# Patient Record
Sex: MALE | Race: OTHER | Employment: FULL TIME | URBAN - METROPOLITAN AREA
[De-identification: names, ages, dates, MRNs, and addresses within clinical notes are randomized per-mention and may not be internally consistent; named-entity substitution may affect disease eponyms.]

---

## 2020-09-28 PROBLEM — N52.9 VASCULOGENIC ERECTILE DYSFUNCTION: Status: ACTIVE | Noted: 2020-09-28

## 2020-09-28 PROBLEM — G47.33 OSA ON CPAP: Status: ACTIVE | Noted: 2020-09-28

## 2020-09-28 PROBLEM — E78.00 HYPERCHOLESTEROLEMIA: Status: ACTIVE | Noted: 2020-09-28

## 2020-09-28 PROBLEM — I10 ESSENTIAL HYPERTENSION: Status: ACTIVE | Noted: 2020-09-28

## 2020-09-28 PROBLEM — Z99.89 OSA ON CPAP: Status: ACTIVE | Noted: 2020-09-28

## 2020-10-13 PROBLEM — E03.8 SUBCLINICAL HYPOTHYROIDISM: Status: ACTIVE | Noted: 2020-10-13

## 2022-01-14 PROBLEM — N40.0 BENIGN PROSTATIC HYPERPLASIA WITHOUT LOWER URINARY TRACT SYMPTOMS: Status: ACTIVE | Noted: 2022-01-14

## 2022-03-14 PROBLEM — Z98.890 HISTORY OF COLONOSCOPY: Status: ACTIVE | Noted: 2022-03-14

## 2022-03-18 PROBLEM — I10 ESSENTIAL HYPERTENSION: Status: ACTIVE | Noted: 2020-09-28

## 2022-03-19 PROBLEM — N40.0 BENIGN PROSTATIC HYPERPLASIA WITHOUT LOWER URINARY TRACT SYMPTOMS: Status: ACTIVE | Noted: 2022-01-14

## 2022-03-19 PROBLEM — Z99.89 OSA ON CPAP: Status: ACTIVE | Noted: 2020-09-28

## 2022-03-19 PROBLEM — E78.00 HYPERCHOLESTEROLEMIA: Status: ACTIVE | Noted: 2020-09-28

## 2022-03-19 PROBLEM — E03.8 SUBCLINICAL HYPOTHYROIDISM: Status: ACTIVE | Noted: 2020-10-13

## 2022-03-19 PROBLEM — Z98.890 HISTORY OF COLONOSCOPY: Status: ACTIVE | Noted: 2022-03-14

## 2022-03-19 PROBLEM — N52.9 VASCULOGENIC ERECTILE DYSFUNCTION: Status: ACTIVE | Noted: 2020-09-28

## 2022-03-19 PROBLEM — G47.33 OSA ON CPAP: Status: ACTIVE | Noted: 2020-09-28

## 2022-05-09 ENCOUNTER — HOSPITAL ENCOUNTER (OUTPATIENT)
Dept: LAB | Age: 57
Discharge: HOME | End: 2022-05-09

## 2022-05-09 PROCEDURE — 88305 TISSUE EXAM BY PATHOLOGIST: CPT

## 2022-05-09 PROCEDURE — 88312 SPECIAL STAINS GROUP 1: CPT

## 2022-05-11 PROBLEM — K21.9 GASTROESOPHAGEAL REFLUX DISEASE WITHOUT ESOPHAGITIS: Status: ACTIVE | Noted: 2022-05-11

## 2022-06-01 ENCOUNTER — HOSPITAL ENCOUNTER (EMERGENCY)
Age: 57
Discharge: HOME | End: 2022-06-01
Attending: STUDENT IN AN ORGANIZED HEALTH CARE EDUCATION/TRAINING PROGRAM
Payer: COMMERCIAL

## 2022-06-01 VITALS
TEMPERATURE: 97.8 F | RESPIRATION RATE: 18 BRPM | OXYGEN SATURATION: 100 % | HEART RATE: 61 BPM | HEIGHT: 62 IN | DIASTOLIC BLOOD PRESSURE: 83 MMHG | BODY MASS INDEX: 32.76 KG/M2 | WEIGHT: 178 LBS | SYSTOLIC BLOOD PRESSURE: 123 MMHG

## 2022-06-01 DIAGNOSIS — R07.9 CHEST PAIN, UNSPECIFIED TYPE: Primary | ICD-10-CM

## 2022-06-01 LAB
ALBUMIN SERPL-MCNC: 4 G/DL (ref 3.5–5)
ALBUMIN/GLOB SERPL: 1.1 {RATIO} (ref 1.2–3.5)
ALP SERPL-CCNC: 78 U/L (ref 50–136)
ALT SERPL-CCNC: 31 U/L (ref 12–65)
ANION GAP SERPL CALC-SCNC: 10 MMOL/L (ref 7–16)
AST SERPL-CCNC: 20 U/L (ref 15–37)
BASOPHILS # BLD: 0.1 K/UL (ref 0–0.2)
BASOPHILS NFR BLD: 1 % (ref 0–2)
BILIRUB SERPL-MCNC: 0.5 MG/DL (ref 0.2–1.1)
BUN SERPL-MCNC: 11 MG/DL (ref 6–23)
CALCIUM SERPL-MCNC: 9.7 MG/DL (ref 8.3–10.4)
CHLORIDE SERPL-SCNC: 105 MMOL/L (ref 98–107)
CO2 SERPL-SCNC: 28 MMOL/L (ref 21–32)
CREAT SERPL-MCNC: 0.92 MG/DL (ref 0.8–1.5)
DIFFERENTIAL METHOD BLD: ABNORMAL
EOSINOPHIL # BLD: 0.2 K/UL (ref 0–0.8)
EOSINOPHIL NFR BLD: 3 % (ref 0.5–7.8)
ERYTHROCYTE [DISTWIDTH] IN BLOOD BY AUTOMATED COUNT: 14.8 % (ref 11.9–14.6)
GLOBULIN SER CALC-MCNC: 3.8 G/DL (ref 2.3–3.5)
GLUCOSE SERPL-MCNC: 115 MG/DL (ref 65–100)
HCT VFR BLD AUTO: 43.7 % (ref 41.1–50.3)
HGB BLD-MCNC: 14.6 G/DL (ref 13.6–17.2)
IMM GRANULOCYTES # BLD AUTO: 0 K/UL (ref 0–0.5)
IMM GRANULOCYTES NFR BLD AUTO: 0 % (ref 0–5)
LIPASE SERPL-CCNC: 199 U/L (ref 73–393)
LYMPHOCYTES # BLD: 2.9 K/UL (ref 0.5–4.6)
LYMPHOCYTES NFR BLD: 58 % (ref 13–44)
MCH RBC QN AUTO: 28 PG (ref 26.1–32.9)
MCHC RBC AUTO-ENTMCNC: 33.4 G/DL (ref 31.4–35)
MCV RBC AUTO: 83.9 FL (ref 79.6–97.8)
MONOCYTES # BLD: 0.5 K/UL (ref 0.1–1.3)
MONOCYTES NFR BLD: 10 % (ref 4–12)
NEUTS SEG # BLD: 1.4 K/UL (ref 1.7–8.2)
NEUTS SEG NFR BLD: 28 % (ref 43–78)
NRBC # BLD: 0 K/UL (ref 0–0.2)
PLATELET # BLD AUTO: 258 K/UL (ref 150–450)
PMV BLD AUTO: 11 FL (ref 9.4–12.3)
POTASSIUM SERPL-SCNC: 3.5 MMOL/L (ref 3.5–5.1)
PROT SERPL-MCNC: 7.8 G/DL (ref 6.3–8.2)
RBC # BLD AUTO: 5.21 M/UL (ref 4.23–5.6)
SODIUM SERPL-SCNC: 143 MMOL/L (ref 136–145)
TROPONIN I SERPL HS-MCNC: 25.5 PG/ML (ref 0–14)
TROPONIN I SERPL HS-MCNC: 27.4 PG/ML (ref 0–14)
WBC # BLD AUTO: 4.9 K/UL (ref 4.3–11.1)

## 2022-06-01 PROCEDURE — 80053 COMPREHEN METABOLIC PANEL: CPT

## 2022-06-01 PROCEDURE — 85025 COMPLETE CBC W/AUTO DIFF WBC: CPT

## 2022-06-01 PROCEDURE — 99284 EMERGENCY DEPT VISIT MOD MDM: CPT

## 2022-06-01 PROCEDURE — 83690 ASSAY OF LIPASE: CPT

## 2022-06-01 PROCEDURE — 84484 ASSAY OF TROPONIN QUANT: CPT

## 2022-06-01 PROCEDURE — 6370000000 HC RX 637 (ALT 250 FOR IP): Performed by: STUDENT IN AN ORGANIZED HEALTH CARE EDUCATION/TRAINING PROGRAM

## 2022-06-01 RX ORDER — ASPIRIN 325 MG
325 TABLET ORAL
Status: COMPLETED | OUTPATIENT
Start: 2022-06-01 | End: 2022-06-01

## 2022-06-01 RX ORDER — NITROGLYCERIN 0.4 MG/1
0.4 TABLET SUBLINGUAL EVERY 5 MIN PRN
Status: DISCONTINUED | OUTPATIENT
Start: 2022-06-01 | End: 2022-06-01 | Stop reason: HOSPADM

## 2022-06-01 RX ORDER — SUCRALFATE 1 G/1
1 TABLET ORAL 4 TIMES DAILY
Qty: 60 TABLET | Refills: 2 | Status: SHIPPED | OUTPATIENT
Start: 2022-06-01 | End: 2022-07-14 | Stop reason: SDUPTHER

## 2022-06-01 RX ADMIN — ASPIRIN 325 MG: 325 TABLET, FILM COATED ORAL at 04:27

## 2022-06-01 RX ADMIN — NITROGLYCERIN 0.4 MG: 0.4 TABLET, ORALLY DISINTEGRATING SUBLINGUAL at 04:29

## 2022-06-01 ASSESSMENT — PAIN SCALES - GENERAL: PAINLEVEL_OUTOF10: 1

## 2022-06-01 ASSESSMENT — ENCOUNTER SYMPTOMS
NAUSEA: 0
SHORTNESS OF BREATH: 0
COLOR CHANGE: 0
ABDOMINAL PAIN: 0
ABDOMINAL DISTENTION: 0
CHEST TIGHTNESS: 0
BACK PAIN: 0
WHEEZING: 0
COUGH: 0
CONSTIPATION: 0
VOMITING: 0

## 2022-06-01 ASSESSMENT — PAIN - FUNCTIONAL ASSESSMENT: PAIN_FUNCTIONAL_ASSESSMENT: 0-10

## 2022-06-01 NOTE — ED NOTES
I have reviewed discharge instructions with the patient. The patient verbalized understanding. Patient left ED via Discharge Method: ambulatory to Home with family/friend. Opportunity for questions and clarification provided. Patient given 1 script. To continue your aftercare when you leave the hospital, you may receive an automated call from our care team to check in on how you are doing. This is a free service and part of our promise to provide the best care and service to meet your aftercare needs.  If you have questions, or wish to unsubscribe from this service please call [phone]. Thank you for Choosing our [facility] Emergency Department. [facility]  [facility], [facility]  06/01/22 4555

## 2022-06-01 NOTE — ED PROVIDER NOTES
[employer] Emergency Department Provider Note                   PCP:                Macey Thapa MD               Age: 60 y.o. Sex: male     No diagnosis found. DISPOSITION         New Prescriptions    No medications on file       Orders Placed This Encounter   Procedures    CBC with Auto Differential    Comprehensive Metabolic Panel    Lipase    Troponin    EKG 12 Lead        MDM  Number of Diagnoses or Management Options  Diagnosis management comments: Orders placed for troponin, labs, chest x-ray, EKG aspirin and nitro. Both of the patient's troponins have remained stable in this department. He is well-appearing and vitally stable as well. He does endorse a history of hypertension high cholesterol. He is medicated for both of these things. His EKG is reassuring. Pain was relieved with 1 nitroglycerin tablet here. Given his reports of associated belching, I will cover for dyspepsia as a cause of his symptoms with Carafate. As a precaution I have arranged follow-up through the [facility] cardiology referral line for recheck. Patient voiced understanding agreement with this plan of care. Amount and/or Complexity of Data Reviewed  Clinical lab tests: ordered and reviewed  Tests in the radiology section of CPT®: reviewed and ordered  Tests in the medicine section of CPT®: ordered and reviewed  Independent visualization of images, tracings, or specimens: yes    Risk of Complications, Morbidity, and/or Mortality  Presenting problems: moderate  Diagnostic procedures: low  Management options: moderate    Patient Progress  Patient progress: stable       Rico Pulliam is a 60 y.o. male who presents to the Emergency Department with chief complaint of    Chief Complaint   Patient presents with    Chest Pain      78-year-old male patient presents to this department with reports of chest pain.   Patient states symptoms started around 9 PM.  He describes aching discomfort in the left chest that has been intermittent nature since onset. He describes it as a mild pain that does radiate into his left shoulder and arm. He denies associated fever, chills, nausea, vomiting, diaphoresis or significant shortness of breath/cough. Note, patient does report some belching intermittently with his discomfort. He reports no burning in his throat or foul taste in his mouth. He does report a history of hypertension and states he checked his blood pressure noting it was elevated this morning. He took his amlodipine last evening as prescribed and is consistent taking this medication on a regular basis. He also reports a history of hypercholesterolemia for which she takes medication as well. He denies previous surgeries on his heart and does not see a cardiologist on a regular basis. Patient reports ongoing pain but states it is mild currently. All other systems reviewed and are negative. Review of Systems   Constitutional: Negative for chills and fatigue. HENT: Negative for congestion. Eyes: Negative for visual disturbance. Respiratory: Negative for cough, chest tightness, shortness of breath and wheezing. Cardiovascular: Positive for chest pain. Negative for leg swelling. Gastrointestinal: Negative for abdominal distention, abdominal pain, constipation, nausea and vomiting. Genitourinary: Negative for difficulty urinating, dysuria, flank pain and hematuria. Musculoskeletal: Negative for back pain, neck pain and neck stiffness. Skin: Negative for color change. Neurological: Negative for dizziness, light-headedness, numbness and headaches. All other systems reviewed and are negative. Past Medical History:   Diagnosis Date    Hypercholesteremia     Hypertension     AGNIESZKA (obstructive sleep apnea)         History reviewed. No pertinent surgical history.      Family History   Problem Relation Age of Onset    No Known Problems Father     No Known Problems Mother Social Connections:     Frequency of Communication with Friends and Family: Not on file    Frequency of Social Gatherings with Friends and Family: Not on file    Attends [employer] Services: Not on file    Active Member of Clubs or Organizations: Not on file    Attends Club or Organization Meetings: Not on file    Marital Status: Not on file        No Known Allergies     Vitals signs and nursing note reviewed. Patient Vitals for the past 4 hrs:   Temp Pulse Resp BP SpO2   06/01/22 0411 97.8 °F (36.6 °C) 63 17 (!) 158/93 99 %          Physical Exam  Vitals and nursing note reviewed. Constitutional:       General: He is not in acute distress. Appearance: Normal appearance. He is normal weight. He is not ill-appearing or toxic-appearing. Comments: Generally well-appearing, alert and oriented x4. No acute distress, speaks in clear, fluid sentences. HENT:      Head: Normocephalic and atraumatic. Right Ear: External ear normal.      Left Ear: External ear normal.      Nose: Nose normal.      Mouth/Throat:      Mouth: Mucous membranes are moist.   Eyes:      General: No scleral icterus. Right eye: No discharge. Left eye: No discharge. Extraocular Movements: Extraocular movements intact. Cardiovascular:      Rate and Rhythm: Normal rate and regular rhythm. Pulses: Normal pulses. Heart sounds: Normal heart sounds. Pulmonary:      Effort: Pulmonary effort is normal. No respiratory distress. Abdominal:      General: Abdomen is flat. There is no distension. Palpations: There is no mass. Tenderness: There is no abdominal tenderness. There is no right CVA tenderness, left CVA tenderness, guarding or rebound. Hernia: No hernia is present. Musculoskeletal:         General: No swelling, tenderness or deformity. Normal range of motion. Cervical back: Normal range of motion. Skin:     General: Skin is warm.       Capillary Refill: Capillary refill takes less than 2 seconds. Neurological:      General: No focal deficit present. Mental Status: He is alert. Psychiatric:         Mood and Affect: Mood normal.          Procedures    Labs Reviewed   CBC WITH AUTO DIFFERENTIAL   COMPREHENSIVE METABOLIC PANEL   LIPASE   TROPONIN        No orders to display            [place] Coma Scale  Eye Opening: Spontaneous  Best Verbal Response: Oriented  Best Motor Response: Obeys commands  Andre Coma Scale Score: 15               ED Course as of 06/01/22 0644   Wed Jun 01, 2022   0428 EKG interpretation: Sinus bradycardia, rate of 57, normal axis, inversion of the T wave segment in lead III, aVF, no obvious ischemia. [BR]   0437 Patient's chest pain is now resolved after 1 nitroglycerin. [BR]   0603 Initial troponin minimally elevated. EKG stable, chest x-ray clear. We will repeat this lab test. [BR]      ED Course User Index  [BR] Antwan Singh DO        Voice dictation software was used during the making of this note. This software is not perfect and grammatical and other typographical errors may be present. This note has not been completely proofread for errors.       Antwan Singh DO  06/01/22 [address] Lowery DO  06/01/22 0534

## 2022-06-01 NOTE — ED TRIAGE NOTES
Pt c/o intermittent left side chest pain that radiates into his left arm since nader 2100 last night. Pt denies any cardiac history other than high BP. Pt masked.

## 2022-06-02 ENCOUNTER — OFFICE VISIT (OUTPATIENT)
Dept: CARDIOLOGY CLINIC | Age: 57
End: 2022-06-02
Payer: COMMERCIAL

## 2022-06-02 VITALS
WEIGHT: 180.6 LBS | SYSTOLIC BLOOD PRESSURE: 122 MMHG | BODY MASS INDEX: 33.23 KG/M2 | DIASTOLIC BLOOD PRESSURE: 82 MMHG | HEIGHT: 62 IN | HEART RATE: 72 BPM

## 2022-06-02 DIAGNOSIS — I10 ESSENTIAL HYPERTENSION: Primary | ICD-10-CM

## 2022-06-02 DIAGNOSIS — E78.00 HYPERCHOLESTEROLEMIA: ICD-10-CM

## 2022-06-02 DIAGNOSIS — R07.89 OTHER CHEST PAIN: ICD-10-CM

## 2022-06-02 PROCEDURE — 99203 OFFICE O/P NEW LOW 30 MIN: CPT | Performed by: INTERNAL MEDICINE

## 2022-06-02 ASSESSMENT — ENCOUNTER SYMPTOMS
ABDOMINAL PAIN: 0
SHORTNESS OF BREATH: 0

## 2022-06-02 NOTE — PROGRESS NOTES
[address] [facility], [address] [place] [address], [address] [place] [address]  PHONE: [phone]      Erica Adame  1965    SUBJECTIVE:   Emelina Silva is a 60 y.o. male seen for a consultation visit regarding the following:     Chief Complaint   Patient presents with    Consultation     CP             HPI:  Consultation is requested by [facility] for evaluation of Consultation (CP )   . 58-year-old gentleman originally from [facility] and referred to me for evaluation of left shoulder left upper chest discomfort. He has never had any known heart disease but has hypertension and hyperlipidemia on medication. He recently woke up and had some left shoulder pain and left arm and upper chest and his blood pressure he says got like 160/80 went to the ER. EKG shows sinus bradycardia with no significant changes and he had has since troponins were like 23 but unchanged and did not elevate. X-ray and other lab work was normal.  He was given some of her blood pressure came down he is feeling better and has not had any recurrence of that symptom. He denies any history of exertional chest discomfort. There is no early family history of heart disease has never been a smoker or have diabetes. He is a           Past Medical History, Past Surgical History, Family history, Social History, and Medications were all reviewed with the patient today and updated as necessary. No Known Allergies  Past Medical History:   Diagnosis Date    Hypercholesteremia     Hypertension     AGNIESZKA (obstructive sleep apnea)      No past surgical history on file.   Family History   Problem Relation Age of Onset    No Known Problems Father     No Known Problems Mother      Social History     Tobacco Use    Smoking status: Never Smoker    Smokeless tobacco: Never Used   Substance Use Topics    Alcohol use: Never       ROS:    Review of Systems   Constitutional: Negative for decreased appetite and weight loss. Cardiovascular: Positive for chest pain. Negative for dyspnea on exertion, irregular heartbeat, leg swelling and palpitations. Respiratory: Negative for shortness of breath. Hematologic/Lymphatic: Negative for bleeding problem. Musculoskeletal: Negative for muscle weakness. Gastrointestinal: Negative for abdominal pain. Neurological: Negative for dizziness and focal weakness. PHYSICAL EXAM:   /82   Pulse 72   Ht 5' 2\" (1.575 m)   Wt 180 lb 9.6 oz (81.9 kg)   BMI 33.03 kg/m²      Physical Exam  Constitutional:       General: He is not in acute distress. Neck:      Vascular: No carotid bruit. Cardiovascular:      Rate and Rhythm: Normal rate and regular rhythm. Pulses: Normal pulses. Heart sounds: No murmur heard. No gallop. Pulmonary:      Effort: Pulmonary effort is normal.   Abdominal:      General: Abdomen is flat. There is no distension. Tenderness: There is no abdominal tenderness. Musculoskeletal:         General: No swelling. Skin:     General: Skin is warm. Neurological:      General: No focal deficit present. Mental Status: He is alert. Psychiatric:         Mood and Affect: Mood normal.         Medical problems and test results were reviewed with the patient today. No results found for any visits on 06/02/22. Lab Results   Component Value Date     06/01/2022    K 3.5 06/01/2022     06/01/2022    CO2 28 06/01/2022    BUN 11 06/01/2022    GFRAA >60 06/01/2022     Lab Results   Component Value Date    CHOL 170 01/14/2022    HDL 42 01/14/2022    VLDL 27 01/14/2022     Lab Results   Component Value Date    ALT 31 06/01/2022   I reviewed the [facility] ER records. EKG shows sinus rhythm rate of 57. Probable LVH. Some inferior T wave changes. Troponins were initially 27 and then came down to 25   ASSESSMENT and PLAN    Erica was seen today for consultation.     Diagnoses and all orders for this visit:    Essential hypertension we will continue -. Blood pressure was high but is better today on Norvasc 10 we may need to add some medications based on his blood pressure readings. Nuclear stress test with myocardial perfusion; Future  -     CHG CT HEART W/O CONTRAST QUANT EVAL CORONARY CALCIUM    Hypercholesterolemia continue Crestor 20  -     Nuclear stress test with myocardial perfusion; Future  -     CHG CT HEART W/O CONTRAST QUANT EVAL CORONARY CALCIUM    Other chest pain patient has some breast discomfort left shoulder or arm left upper chest.  No acute in tree noted on the EKG has LVH pattern some nonspecific T wave change. Blood pressure is currently elevated at the time is doing better. Does have some risk factors with heart disease. There was no rise in troponin noted. We will set him up for a nuclear stress test tachycardia with some LVH pattern on his EKG regular stress test can be difficult to interpret. We will set this up soon and will get a coronary calcium score as well we will start baby aspirin just in case he had any other risk factors. -     Nuclear stress test with myocardial perfusion; Future  -     CHG CT HEART W/O CONTRAST QUANT EVAL CORONARY CALCIUM        [unfilled]      No follow-up provider specified. Thank you for allowing me to participate in this patient's care. Please call or contact me if there are any questions or concerns regarding the above.       Lola Salvador MD  06/02/22  12:27 PM        Consult note

## 2022-06-14 ENCOUNTER — TELEPHONE (OUTPATIENT)
Dept: CARDIOLOGY CLINIC | Age: 57
End: 2022-06-14

## 2022-06-14 NOTE — TELEPHONE ENCOUNTER
----- Message from Erik Carranza MD sent at 6/13/2022  6:14 PM EDT -----  Call pt patient had a normal nuclear stress test good level of exercise

## 2022-06-28 ENCOUNTER — OFFICE VISIT (OUTPATIENT)
Dept: CARDIOLOGY CLINIC | Age: 57
End: 2022-06-28
Payer: COMMERCIAL

## 2022-06-28 VITALS
HEIGHT: 62 IN | WEIGHT: 183.5 LBS | SYSTOLIC BLOOD PRESSURE: 120 MMHG | BODY MASS INDEX: 33.77 KG/M2 | HEART RATE: 76 BPM | DIASTOLIC BLOOD PRESSURE: 78 MMHG

## 2022-06-28 DIAGNOSIS — E78.00 HYPERCHOLESTEROLEMIA: ICD-10-CM

## 2022-06-28 DIAGNOSIS — R07.89 OTHER CHEST PAIN: ICD-10-CM

## 2022-06-28 DIAGNOSIS — I10 ESSENTIAL HYPERTENSION: Primary | ICD-10-CM

## 2022-06-28 PROCEDURE — 99213 OFFICE O/P EST LOW 20 MIN: CPT | Performed by: INTERNAL MEDICINE

## 2022-06-28 ASSESSMENT — ENCOUNTER SYMPTOMS: SHORTNESS OF BREATH: 0

## 2022-06-28 NOTE — PROGRESS NOTES
[address], [address] [facility], [address] [place] [address]  PHONE: [phone]    Erica Raymundo Sayer  1965      SUBJECTIVE:   Karen Castellanos is a 60 y.o. male seen for a follow up visit regarding the following:     Chief Complaint   Patient presents with    Hypertension       HPI:    49-year-old gentleman comes back for follow-up of his nuclear stress test.  Referred to me for some left upper shoulder left chest type pain seen in the ER EKGs were negative. This happened while waking up 1 night. He has been placed on Carafate and doing okay. He came back for a nuclear stress test and he did very well going 11 minutes and 40 seconds stage IV with no chest pain at all no ST changes noted and a normal nuclear scan and function. He is on his blood pressure medicines as is doing well and on statin therapy. Past Medical History, Past Surgical History, Family history, Social History, and Medications were all reviewed with the patient today and updated as necessary. No Known Allergies  Past Medical History:   Diagnosis Date    Hypercholesteremia     Hypertension     AGNIESZKA (obstructive sleep apnea)      No past surgical history on file. Family History   Problem Relation Age of Onset    No Known Problems Father     No Known Problems Mother       Social History     Tobacco Use    Smoking status: Never Smoker    Smokeless tobacco: Never Used   Substance Use Topics    Alcohol use: Never       ROS:    Review of Systems   Cardiovascular: Negative for chest pain and dyspnea on exertion. Respiratory: Negative for shortness of breath.             PHYSICAL EXAM:    /78   Pulse 76   Ht 5' 2\" (1.575 m)   Wt 183 lb 8 oz (83.2 kg)   BMI 33.56 kg/m²        Wt Readings from Last 3 Encounters:   06/28/22 183 lb 8 oz (83.2 kg)   06/10/22 180 lb (81.6 kg)   06/02/22 180 lb 9.6 oz (81.9 kg)     BP Readings from Last 3 Encounters:   06/28/22 120/78   06/10/22 (!) 130/90   06/02/22 122/82 Physical Exam  Constitutional:       General: He is not in acute distress. Cardiovascular:      Rate and Rhythm: Normal rate and regular rhythm. Heart sounds: No murmur heard. No gallop. Pulmonary:      Effort: Pulmonary effort is normal.      Breath sounds: No rales. Neurological:      Mental Status: He is alert. Medical problems and test results were reviewed with the patient today. No results found for any visits on 06/28/22. Lab Results   Component Value Date     06/01/2022    K 3.5 06/01/2022     06/01/2022    CO2 28 06/01/2022    BUN 11 06/01/2022    GFRAA >60 06/01/2022     Lab Results   Component Value Date    CHOL 170 01/14/2022    HDL 42 01/14/2022    VLDL 27 01/14/2022   Nuclear stress test exercise limits 42nd stage IV no symptoms reported. No ischemia noted      ASSESSMENT and PLAN    Erica was seen today for hypertension. Diagnoses and all orders for this visit:    Essential hypertension is stable on Norvasc     Other chest pain had this atypical pain left upper chest arm waking him up. On his stress test he did very well excellent level of exercise with no reproducible symptoms no chest pain noted normal images. No ischemia noted. I reviewed those agree with my partner    Hypercholesterolemia continue statin therapy. I discussed this with him today and I do not see any obvious evidence of any of severe obstructive disease with a regular level of exercise. He needs to continue to treat risk factor of hypertension hyperlipidemia continue to exercise. He can follow-up with Dr. Uday Henderson  [unfilled]      No follow-up provider specified.     Servando Simons MD  6/28/2022  2:44 PM

## 2022-07-14 ENCOUNTER — OFFICE VISIT (OUTPATIENT)
Dept: FAMILY MEDICINE CLINIC | Facility: CLINIC | Age: 57
End: 2022-07-14
Payer: COMMERCIAL

## 2022-07-14 VITALS
WEIGHT: 185 LBS | BODY MASS INDEX: 33.84 KG/M2 | OXYGEN SATURATION: 98 % | SYSTOLIC BLOOD PRESSURE: 124 MMHG | HEART RATE: 63 BPM | DIASTOLIC BLOOD PRESSURE: 80 MMHG

## 2022-07-14 DIAGNOSIS — E78.00 HYPERCHOLESTEROLEMIA: ICD-10-CM

## 2022-07-14 DIAGNOSIS — I10 ESSENTIAL HYPERTENSION: ICD-10-CM

## 2022-07-14 DIAGNOSIS — G89.29 CHRONIC LEFT SHOULDER PAIN: ICD-10-CM

## 2022-07-14 DIAGNOSIS — M19.042 LOCALIZED OSTEOARTHRITIS OF BOTH HANDS: ICD-10-CM

## 2022-07-14 DIAGNOSIS — M25.512 CHRONIC LEFT SHOULDER PAIN: ICD-10-CM

## 2022-07-14 DIAGNOSIS — M19.041 LOCALIZED OSTEOARTHRITIS OF BOTH HANDS: ICD-10-CM

## 2022-07-14 DIAGNOSIS — I10 ESSENTIAL HYPERTENSION: Primary | ICD-10-CM

## 2022-07-14 PROBLEM — R07.89 OTHER CHEST PAIN: Status: RESOLVED | Noted: 2022-06-02 | Resolved: 2022-07-14

## 2022-07-14 LAB
ANION GAP SERPL CALC-SCNC: 4 MMOL/L (ref 7–16)
BUN SERPL-MCNC: 8 MG/DL (ref 6–23)
CALCIUM SERPL-MCNC: 9.6 MG/DL (ref 8.3–10.4)
CHLORIDE SERPL-SCNC: 106 MMOL/L (ref 98–107)
CO2 SERPL-SCNC: 30 MMOL/L (ref 21–32)
CREAT SERPL-MCNC: 1 MG/DL (ref 0.8–1.5)
GLUCOSE SERPL-MCNC: 93 MG/DL (ref 65–100)
POTASSIUM SERPL-SCNC: 3.5 MMOL/L (ref 3.5–5.1)
SODIUM SERPL-SCNC: 140 MMOL/L (ref 138–145)

## 2022-07-14 PROCEDURE — 99214 OFFICE O/P EST MOD 30 MIN: CPT | Performed by: FAMILY MEDICINE

## 2022-07-14 RX ORDER — ROSUVASTATIN CALCIUM 20 MG/1
20 TABLET, COATED ORAL DAILY
Qty: 90 TABLET | Refills: 1 | Status: SHIPPED | OUTPATIENT
Start: 2022-07-14

## 2022-07-14 RX ORDER — SUCRALFATE 1 G/1
1 TABLET ORAL 4 TIMES DAILY
Qty: 360 TABLET | Refills: 1 | Status: SHIPPED | OUTPATIENT
Start: 2022-07-14

## 2022-07-14 RX ORDER — AMLODIPINE BESYLATE 10 MG/1
10 TABLET ORAL DAILY
Qty: 90 TABLET | Refills: 1 | Status: SHIPPED | OUTPATIENT
Start: 2022-07-14

## 2022-07-14 RX ORDER — SILDENAFIL CITRATE 20 MG/1
TABLET ORAL
Qty: 30 TABLET | Refills: 5 | Status: SHIPPED | OUTPATIENT
Start: 2022-07-14

## 2022-07-14 ASSESSMENT — PATIENT HEALTH QUESTIONNAIRE - PHQ9
SUM OF ALL RESPONSES TO PHQ QUESTIONS 1-9: 0
SUM OF ALL RESPONSES TO PHQ QUESTIONS 1-9: 0
SUM OF ALL RESPONSES TO PHQ9 QUESTIONS 1 & 2: 0
SUM OF ALL RESPONSES TO PHQ QUESTIONS 1-9: 0
SUM OF ALL RESPONSES TO PHQ QUESTIONS 1-9: 0
1. LITTLE INTEREST OR PLEASURE IN DOING THINGS: 0
2. FEELING DOWN, DEPRESSED OR HOPELESS: 0

## 2022-07-14 NOTE — PATIENT INSTRUCTIONS
Patient Education        Artritis de mano: Ejercicios  Hand Arthritis: Exercises  Instrucciones de cuidado  Estos son algunos ejemplos de ejercicios para la artritis de [place]. Comience cada ejercicio lentamente. Reduzca la intensidad del ejercicio si empieza asentir dolor. Layton médico o layton fisioterapeuta o terapeuta ocupacional le dirá cuándo puedecomenzar con estos ejercicios y cuáles funcionarán mejor para usted. Cómo se hacen los ejercicios  Deslizamiento de tendones    1. En payton ejercicio, los pasos se hacen jose alberto tras otro para hacer un movimiento continuo. 2. Con la mano afectada, apunte con los dedos y el pulgar [place]. Layton jameson debe estar relajada, siguiendo la línea de los dedos y del pulgar. 3. Doble los dedos para que se flexionen las dos primeras articulaciones, y los dedos se plieguen hacia abajo. La punta de carolina dedos debe tocar o estar cerca de la base de los mismos. [place] dedos se verán salinas un gancho. 4. Cierre el puño doblando los nudillos. Layton pulgar puede descansar suavemente en layton dedo índice. 5. Relaje ligeramente los dedos para que las puntas de carolina dedos puedan tocar la base de la tidwell de la [place]. Layton pulgar puede descansar en layton dedo índice. 6. Vuelva a la posición inicial, con los dedos y el pulgar apuntando [place]. 7. Repita la serie de movimientos entre 8 y 15 veces. 8. Cambie de mano y repita los pasos del 1 al 6, aunque solo tenga adolorida bruna [place]. Flexión intrínseca    1. Descanse la mano afectada en bruna [place] y flexione las articulaciones grandes en donde los dedos se conectan a la mano. Mantenga recto el dedo pulgar y las otras articulaciones de los dedos. 2. Estire lentamente los dedos. Layton jameson debe estar relajada, siguiendo la línea de los dedos y del pulgar. 3. Vuelva a layton posición inicial, con la mano flexionada. 4. Repita entre 8 y 12 veces. 5. Cambie de mano y repita los pasos del 1 al 4, aunque solo tenga adolorida bruna [place].   Extensión del dedo    1. Coloque la mano afectada sin flexionar sobre bruna [place]. 2. Levante y luego baje de la york un dedo a la vez.  3. Repita entre 8 y 15 veces. 4. Cambie de mano y repita los pasos del 1 al 3, aunque solo tenga adolorida bruna [place]. Extensión de MP    1. Coloque la mano buena en bruna york con la tidwell [place] arriba. Ponga la mano afectada encima de la mano buena, con los dedos [employer] alrededor del pulgar de la mano buena salinas si estuviera cerrando el puño. 2. Desenrolle lentamente las articulaciones de la mano afectada en donde los dedos se conectan a la mano de manera que solo las dos articulaciones superiores de los dedos estén flexionadas. [place] dedos se verán salinas un gancho. 3. Vuelva a la posición inicial, con los dedos enrollados en el pulgar berumen. 4. Repita entre 8 y 12 veces. 5. Cambie de mano y repita los pasos del 1 al 4, aunque solo tenga adolorida bruna [place]. Extensión de PIP (con extensión de MP)    1. Coloque la mano buena en bruna [place] con la tidwell [place] arriba. Ponga la mano afectada encima de la mano buena con la tidwell [place] arriba. 2. Utilice el pulgar y los dedos de la mano buena para sujetar un dedo de la mano afectada, por debajo de la articulación media. 3. [place] [employer] últimas articulaciones de ira dedo. 4. Repita entre 8 y 12 veces. 5. Repita los pasos del 1 al 4 para cada dedo. 6. Cambie de mano y repita los pasos del 1 al 5, aunque solo tenga adolorida [place]. Flexión de DIP    1. Con la mano buena, sujete un dedo de la mano afectada. Jarrett pulgar estará en la parte superior del dedo, madelin por debajo de la articulación que está más cerca de la [place]. 2. Doble lentamente el dedo afectado solo en la articulación [place] a la uña. 3. Repita entre 8 y 12 veces. 4. Repita los pasos del 1 al 3 para cada dedo. 5. Cambie de mano y repita los pasos del 1 al 4, aunque solo tenga adolorida bruna [place]. La atención de seguimiento es bruna parte clave de jarrett tratamiento y seguridad. Asegúrese de hacer y acudir a todas las citas, y llame a layton médico si está teniendo problemas. También es bruna buena idea saber los [place] de susexámenes y mantener bruna lista de los medicamentos que patrick. ¿Dónde puede encontrar más información en inglés? Cherelle Lindsay a [email or web address] e ingrese a layton cuenta de MyChart. Alma NOEL en el [place] \"Search Health Information\" para más información (en inglés) sobre \"Artritis de mano: Ejercicios. \"     Si no tiene bruna cuenta, jia dav en el enlace \"Sign Up Now\". Revisado: 9 marzo, 2022               Versión del contenido: 13.3  © 9469-4318 Healthwise, Incorporated. Las instrucciones de cuidado fueron adaptadas bajo licencia por [facility]. Si usted tiene [place] afección médica o sobre estas instrucciones, siempre pregunte a layton profesional de champ. Healthwise, Incorporated niega toda garantía o responsabilidad por layton uso de esta información. Patient Education        Artritis: Instrucciones de cuidado  Arthritis: Care Instructions  Instrucciones de cuidado  La artritis, también llamada osteoartritis, es un desgaste del cartílago que amortigua las articulaciones. Cuando el cartílago se desgasta, los huesos se frotan entre sí. [place] causa dolor y rigidez. Muchas personas tienen algo de artritis a medida que envejecen. Con mayor frecuencia, la artritis afecta las articulaciones de la columna vertebral, las yanira, las caderas, las rodillas olos pies. Usted puede tom [employer] sencillas para proteger cam articulaciones, aliviarel dolor y ayudarle a permanecer activo. La atención de seguimiento es bruna parte clave de layton tratamiento y seguridad. Asegúrese de hacer y acudir a todas las citas, y llame a layton médico si está teniendo problemas. También es bruna buena idea saber los [place] de susexámenes y mantener bruna lista de los medicamentos que patrick. ¿Cómo puede cuidarse en el hogar?  Mantenga un peso saludable.  Patt Kessler significa un esfuerzo adicional para las articulaciones.  Hable con layton médico o fisioterapeuta acerca de los ejercicios que le ayudarán a aliviar el dolor de las articulaciones. ? Estírese. Es posible que pueda disfrutar de formas suaves de yoga para ayudar a mantener flexibles las articulaciones y los músculos. ? Camine en lugar de correr. Otros tipos de ejercicio que sobrecargan menos las articulaciones incluyen montar en sukhdeep Bravo, devon chi o hacer ejercicios en el agua. ? [place] pesas. Tener los músculos carlos ayuda a reducir la carga en las articulaciones. Por ejemplo, los músculos más carlos en los muslos reducen parte de la carga sobre las rodillas y las caderas. Aprenda la manera correcta de levantar pesas para no empeorar el dolor de las articulaciones.  [employer] medicamentos exactamente salinas le fueron recetados. Llame a layton médico si maria e estar teniendo problemas con layton medicamento.  Bartolo los analgésicos (medicamentos para el dolor) exactamente según las indicaciones. ? Si el médico le recetó un analgésico, tómelo según las indicaciones. ? Si no está tomando un analgésico recetado, pregúntele a layton médico si puede tom jose alberto de [employer].  Si necesita ayuda para desplazarse, use un bastón, bruna [place]i, un andador u otro aparato. Pueden ayudar a descansar las articulaciones. Kolleen Hones usar otras cosas para facilitarse la erich, salinas un asiento de inodoro más alto y asas acolchadas en los utensilios de cocina.  No se siente en dana bajas, ya que puede resultarle difícil ponerse de pie.  Ponga calor o frío sobre las articulaciones adoloridas según sea necesario. Use lo que [employer]. También puede alternar las compresas calientes y frías. ? Aplíquese calor 2 o 3 veces al día por entre 20 y 27 minutos, usando bruna almohadilla térmica, Vernel Post ducha caliente o bruna compresa caliente, para aliviar el dolor y la rigidez.   ? Colóquese hielo o bruna compresa fría en la articulación adolorida por entre 10 y 21 minutos cada vez. Póngase un paño holloway entre el hielo y la piel. ¿Cuándo debe pedir ayuda? Llame a layton médico ahora mismo o busque atención médica inmediata si:     Tiene hinchazón, calor o dolor repentinos en alguna articulación.      Wang Grijalva bruna articulación y tiene fiebre o salpullido.      El dolor es tan concepción que no puede usar bruna articulación. Preste especial atención a los cambios en layton champ y asegúrese de comunicarsecon layton médico si:     Tiene síntomas articulares leves que continúan aun con más de 6 semanas de cuidado en el hogar.      Tiene dolor de estómago u otros problemas con cam medicamentos. ¿Dónde puede encontrar más información en inglés? Tacho Hicks a [email or web address] e ingrese a layton cuenta de MyChart. Soni Chicas YRobb en el [place] \"Search Health Information\" para más información (en inglés) sobre \"Artritis: Instrucciones de cuidado. \"     Si no tiene bruna cuenta, jia dav en el enlace \"Sign Up Now\". Revisado: 20 diciembre, 2021               Versión del contenido: 13.3  © 2006-2022 Healthwise, Incorporated. Las instrucciones de cuidado fueron adaptadas bajo licencia por [facility] ([facility]). Si usted tiene [place] afección médica o sobre estas instrucciones, siempre pregunte a layton profesional de champ. Healthwise, Incorporated niega toda garantía o responsabilidad por layton uso de esta información.

## 2022-07-14 NOTE — PROGRESS NOTES
Subjective:  Erica Carrillo is a 60 y.o. male presents today for their semi-annual htn visit. They are having no side effects and are doing well. Systems review of cardiovascular and pulmonary systems reveal no complaints or pertinent positives. Patient denies any pounding heart beats or rapid heart beat intervals, flushing, panic like attacks, headaches, dizzyness or flushing. They have drug reistant hypertension, on 3 or more different medicaitons including one diuretic- No  PHQ-9 Total Score: 0 (7/14/2022 10:26 AM)  Also has some questions about increased swelling and sometimes discomfort in some of the fingers of both hands. Gradual over the last year or so. Also was seen in the ER with some elevated blood pressure and atypical chest pain back in the spring, subsequent follow-up with cardiologist where there was a negative work-up including negative stress nuclear test.  ER records and cardiology records reviewed. He continues to have some vague left shoulder pain that was present at that time. Was not felt to be cardiac. He is interested in referral for PT    How much are you exercising? Not active  Do you smoke? No  Are you taking your medicines as directed most every day? Yes  Do you follow a low sodium DASH diet or similar high blood pressure diet? Yes  Do you check your bp at home with an automated blood pressure device? Yes  If you don't have a home bp machine, you should purchase one at home and begin to check your pressure at home on a regular basis. Your blood pressure goal is listed under the \"plan section\" below    No Known Allergies   reports that he has never smoked.  He has never used smokeless tobacco.  Current Outpatient Medications   Medication Sig Dispense Refill    sildenafil (REVATIO) 20 MG tablet Take 3-5 as needed on empty stomach prn 30 tablet 5    rosuvastatin (CRESTOR) 20 MG tablet Take 1 tablet by mouth daily 90 tablet 1    amLODIPine (NORVASC) 10 MG tablet Take 1 tablet by mouth daily 90 tablet 1    sucralfate (CARAFATE) 1 GM tablet Take 1 tablet by mouth 4 times daily 360 tablet 1     No current facility-administered medications for this visit. Lab Results   Component Value Date/Time     06/01/2022 04:18 AM    K 3.5 06/01/2022 04:18 AM     06/01/2022 04:18 AM    CO2 28 06/01/2022 04:18 AM    BUN 11 06/01/2022 04:18 AM    CREATININE 0.92 06/01/2022 04:18 AM    GLUCOSE 115 06/01/2022 04:18 AM    CALCIUM 9.7 06/01/2022 04:18 AM        Objective:  Blood pressure 124/80, pulse 63, weight 185 lb (83.9 kg), SpO2 98 %. Body mass index is 33.84 kg/m². BP Readings from Last 3 Encounters:   07/14/22 124/80   06/28/22 120/78   06/10/22 (!) 130/90     General- Pleasant and no distress  Psych- alert and oriented to person, place and time  Mood and affect are appropriate to situation  Musculoskeletal - Gait and station examination reveals mid-position with no abnormalities. Neurological- grossly intact. rrr s mrg.   bcta  extremeties are without edema, and dp, and pt pulses are intact  No carotid bruits   Fundoscopic exam is: benign without retinopathology   Hands do reveal evidence of Heberden's nodes in both hands. Assessment:  1. Essential hypertension    2. Hypercholesterolemia    3. Chronic left shoulder pain    4. Localized osteoarthritis of both hands        Plan:   Prescription drug management occurs today as follows:  Because current regimen for blood pressure is now working and tolerated, will continue medications as listed    We discussed Voltaren for hands and fingers but will hold off of that for now. Will refer him to PT for his left shoulder vague discomfort  Ese Garcia has been given the following recommendations today due to his elevated BP reading: lab tests ordered. Personal instruction is given.   For your information, consider the following:  [facility] is an excellent site that will give you a list of approved blood pressure devices  [employer] is an excellent site that will teach you how to take accurate bp measurements at home. Significant weight loss can result in a drop of 5-10mm blood pressure. A DASH diet (see bookstore or go to [email or web address] and print DASH diet) will lower 8-14mm blood pressure. Salt restriction will lower your bp 2-8mm. Lowering alcohol consumption to moderate use will lower your pressure 2-4mm. Continue efforts to maintain an exercise regimen. Normal blood pressure target is now 120/80. Stage 1 or \"pre-hypertension\" or \"high normal hypertension\" is 130-139/80-89. We sometimes begin treatment with medications. Stage 2 is >140/90 which is when we always begin treatment with medications. For high risk patients such as those with heart disease, a history of stents, angioplasty, heart attacks, strokes, diabetes and chronic kidney disease,your blood pressure goal is 130/80. For lower risk patients without the high risk categories, your goal for blood pressure is 139/89. Unless you are older than 72years old we may try for a systolic bp of 700 or we will accept higher pressures if the lower pressures are not tolerated. 1. Essential hypertension  -     Basic Metabolic Panel; Future  -     amLODIPine (NORVASC) 10 MG tablet; Take 1 tablet by mouth daily, Disp-90 tablet, R-1Normal  2. Hypercholesterolemia  -     rosuvastatin (CRESTOR) 20 MG tablet; Take 1 tablet by mouth daily, Disp-90 tablet, R-1Normal  3. Chronic left shoulder pain  -     Amb External Referral To Physical Therapy  4.  Localized osteoarthritis of both hands      Followup:  Return for 6 mo for htn, lipids, prostate, tsh.

## 2023-01-17 ENCOUNTER — OFFICE VISIT (OUTPATIENT)
Dept: FAMILY MEDICINE CLINIC | Facility: CLINIC | Age: 58
End: 2023-01-17
Payer: COMMERCIAL

## 2023-01-17 VITALS
SYSTOLIC BLOOD PRESSURE: 117 MMHG | DIASTOLIC BLOOD PRESSURE: 75 MMHG | OXYGEN SATURATION: 96 % | HEART RATE: 73 BPM | WEIGHT: 186.4 LBS | BODY MASS INDEX: 34.09 KG/M2

## 2023-01-17 DIAGNOSIS — N40.0 BENIGN PROSTATIC HYPERPLASIA WITHOUT LOWER URINARY TRACT SYMPTOMS: ICD-10-CM

## 2023-01-17 DIAGNOSIS — F51.01 PRIMARY INSOMNIA: ICD-10-CM

## 2023-01-17 DIAGNOSIS — I10 ESSENTIAL HYPERTENSION: Primary | ICD-10-CM

## 2023-01-17 DIAGNOSIS — E03.8 SUBCLINICAL HYPOTHYROIDISM: ICD-10-CM

## 2023-01-17 DIAGNOSIS — E78.00 HYPERCHOLESTEROLEMIA: ICD-10-CM

## 2023-01-17 PROCEDURE — 90674 CCIIV4 VAC NO PRSV 0.5 ML IM: CPT | Performed by: FAMILY MEDICINE

## 2023-01-17 PROCEDURE — 90471 IMMUNIZATION ADMIN: CPT | Performed by: FAMILY MEDICINE

## 2023-01-17 PROCEDURE — 3074F SYST BP LT 130 MM HG: CPT | Performed by: FAMILY MEDICINE

## 2023-01-17 PROCEDURE — 3078F DIAST BP <80 MM HG: CPT | Performed by: FAMILY MEDICINE

## 2023-01-17 PROCEDURE — 99214 OFFICE O/P EST MOD 30 MIN: CPT | Performed by: FAMILY MEDICINE

## 2023-01-17 RX ORDER — ROSUVASTATIN CALCIUM 20 MG/1
20 TABLET, COATED ORAL DAILY
Qty: 90 TABLET | Refills: 1 | Status: SHIPPED | OUTPATIENT
Start: 2023-01-17

## 2023-01-17 RX ORDER — AMLODIPINE BESYLATE 10 MG/1
10 TABLET ORAL DAILY
Qty: 90 TABLET | Refills: 1 | Status: SHIPPED | OUTPATIENT
Start: 2023-01-17

## 2023-01-17 RX ORDER — SILDENAFIL CITRATE 20 MG/1
TABLET ORAL
Qty: 30 TABLET | Refills: 5 | Status: SHIPPED | OUTPATIENT
Start: 2023-01-17

## 2023-01-17 ASSESSMENT — PATIENT HEALTH QUESTIONNAIRE - PHQ9
1. LITTLE INTEREST OR PLEASURE IN DOING THINGS: 0
SUM OF ALL RESPONSES TO PHQ QUESTIONS 1-9: 0
SUM OF ALL RESPONSES TO PHQ QUESTIONS 1-9: 0
2. FEELING DOWN, DEPRESSED OR HOPELESS: 0
SUM OF ALL RESPONSES TO PHQ QUESTIONS 1-9: 0
SUM OF ALL RESPONSES TO PHQ9 QUESTIONS 1 & 2: 0
SUM OF ALL RESPONSES TO PHQ QUESTIONS 1-9: 0

## 2023-01-17 NOTE — PROGRESS NOTES
Subjective:  Erica Riley is a 59 y.o. male presents today for their semi-annual htn, lipids, hypothyroidism, and prostate check, refills and visit. They are having no side effects and are doing well. Systems review of cardiovascular and pulmonary systems reveal no complaints or pertinent positives. Patient denies any pounding heart beats or rapid heart beat intervals, flushing, panic like attacks, headaches, dizzyness or flushing. They have drug reistant hypertension, on 3 or more different medicaitons including one diuretic- No  Trouble getting restful sleep, sometimes wakes up in the middle the night. How much are you exercising? No  Do you smoke? No  Are you taking your medicines as directed most every day? Yes  Do you follow a low sodium DASH diet or similar high blood pressure diet? Yes  Do you check your bp at home with an automated blood pressure device? Yes  If you don't have a home bp machine, you should purchase one at home and begin to check your pressure at home on a regular basis. Your blood pressure goal is listed under the \"plan section\" below  PHQ-9 Total Score: 0 (1/17/2023  3:38 PM)    No Known Allergies   reports that he has never smoked. He has never used smokeless tobacco.  Current Outpatient Medications   Medication Sig Dispense Refill    sildenafil (REVATIO) 20 MG tablet Take 3-5 as needed on empty stomach prn 30 tablet 5    rosuvastatin (CRESTOR) 20 MG tablet Take 1 tablet by mouth daily 90 tablet 1    amLODIPine (NORVASC) 10 MG tablet Take 1 tablet by mouth daily 90 tablet 1    sucralfate (CARAFATE) 1 GM tablet Take 1 tablet by mouth 4 times daily 360 tablet 1     No current facility-administered medications for this visit.        Lab Results   Component Value Date/Time     07/14/2022 10:48 AM    K 3.5 07/14/2022 10:48 AM     07/14/2022 10:48 AM    CO2 30 07/14/2022 10:48 AM    BUN 8 07/14/2022 10:48 AM    CREATININE 1.00 07/14/2022 10:48 AM    GLUCOSE 93 07/14/2022 10:48 AM    CALCIUM 9.6 07/14/2022 10:48 AM          Objective:  Blood pressure 117/75, pulse 73, weight 186 lb 6.4 oz (84.6 kg), SpO2 96 %. Body mass index is 34.09 kg/m². BP Readings from Last 3 Encounters:   01/17/23 117/75   07/14/22 124/80   06/28/22 120/78     General- Pleasant and no distress  Psych- alert and oriented to person, place and time  Mood and affect are appropriate to situation  Musculoskeletal - Gait and station examination reveals mid-position with no abnormalities. Neurological- grossly intact. rrr s mrg.   bcta  extremeties are without edema, and dp, and pt pulses are intact  No carotid bruits   Fundoscopic exam is: benign without retinopathology   Prostate of increased size and without asymmetry, nodules, or masses. He has good rectal tone with light brown stool. Some of today's visit is spent counseling with review of symptoms, disposition, prognosis and treatment plan options in addition to limited behavioral counseling and recommendations regarding an enlarged prostate and possible symptoms of low testosterone    AUA symptom index if administered is on flow sheet  Return in one year for annual prostate exam. We will call you back if labs are abnormal.      Assessment:  1. Essential hypertension    2. Hypercholesterolemia    3. Subclinical hypothyroidism    4. Benign prostatic hyperplasia without lower urinary tract symptoms    5. Primary insomnia        Plan:   Prescription drug management occurs today as follows:  Because current regimen for blood pressure is now working and tolerated, will continue medications as listed    Merryl Epley has been given the following recommendations today due to his elevated BP reading: lab tests ordered. Personal instruction is given.   For your information, consider the following:  Heather Herbert is an excellent site that will give you a list of approved blood pressure devices  Luis Miguel Fierro is an excellent site that will teach you how to take accurate bp measurements at home. Significant weight loss can result in a drop of 5-10mm blood pressure. A DASH diet (see bookstore or go to [email or web address] and print DASH diet) will lower 8-14mm blood pressure. Salt restriction will lower your bp 2-8mm. Lowering alcohol consumption to moderate use will lower your pressure 2-4mm. Continue efforts to maintain an exercise regimen. Normal blood pressure target is now 120/80. Stage 1 or \"pre-hypertension\" or \"high normal hypertension\" is 130-139/80-89. We sometimes begin treatment with medications. Stage 2 is >140/90 which is when we always begin treatment with medications. For high risk patients such as those with heart disease, a history of stents, angioplasty, heart attacks, strokes, diabetes and chronic kidney disease,your blood pressure goal is 130/80. For lower risk patients without the high risk categories, your goal for blood pressure is 139/89. Unless you are older than 72years old we may try for a systolic bp of 027 or we will accept higher pressures if the lower pressures are not tolerated. Annual TSH is pending. Continue on your current dose of thyroid medicine until we contact you with today's lab results. Also, remember, it is very important to take your thyroid medication on an empty stomach WITH WATER ONLY, not juice, not milk, and as the first medicine in the morning (unless you have been told to take it at bedtime on an empty stomach). You should take no other medication or food for 1 hr after taking your thyroid medicine. Therefore, most people take their thyroid medicine immediately after awakening in the morning. You can take all other SUPPLEMENTS  (vitamins, minerals etc.) about 3 hours later, or better yet with lunch or dinner.      We will call you back to reschedule a return lab appointment if the TSH we hanna today is still not optimal. That appointment will be about 8-10 weeks from today in order to give the new dose we phone in to reach a steady state. If your thyroid stimulating hormone is normal today, we will recheck your level in one year, and you will just continue your current thyroid medication dosage. For your insomnia and to help you sleep better, we will begin a trial of Benadryl 25 mg over-the-counter. This is also known as diphenhydramine. You can purchase it without a prescription. Take one about 30 minutes before bedtime -if this does not work let us know    1. Essential hypertension  -     Basic Metabolic Panel; Future  -     amLODIPine (NORVASC) 10 MG tablet; Take 1 tablet by mouth daily, Disp-90 tablet, R-1Normal  2. Hypercholesterolemia  -     Lipid Panel; Future  -     rosuvastatin (CRESTOR) 20 MG tablet; Take 1 tablet by mouth daily, Disp-90 tablet, R-1Normal  3. Subclinical hypothyroidism  -     TSH; Future  -     T4, Free; Future  4. Benign prostatic hyperplasia without lower urinary tract symptoms  -     PSA, Diagnostic; Future  5. Primary insomnia    Followup:  Return for 6 mo for bp recheck.

## 2023-01-17 NOTE — PATIENT INSTRUCTIONS
For your insomnia and to help you sleep better, we will begin a trial of Benadryl 25 mg over-the-counter. This is also known as diphenhydramine. You can purchase it without a prescription.   Take one about 30 minutes before bedtime -if this does not work let us know

## 2023-01-18 LAB
ANION GAP SERPL CALC-SCNC: 6 MMOL/L (ref 2–11)
BUN SERPL-MCNC: 10 MG/DL (ref 6–23)
CALCIUM SERPL-MCNC: 9.7 MG/DL (ref 8.3–10.4)
CHLORIDE SERPL-SCNC: 109 MMOL/L (ref 101–110)
CHOLEST SERPL-MCNC: 186 MG/DL
CO2 SERPL-SCNC: 27 MMOL/L (ref 21–32)
CREAT SERPL-MCNC: 0.9 MG/DL (ref 0.8–1.5)
GLUCOSE SERPL-MCNC: 114 MG/DL (ref 65–100)
HDLC SERPL-MCNC: 50 MG/DL (ref 40–60)
HDLC SERPL: 3.7
LDLC SERPL CALC-MCNC: 94.2 MG/DL
POTASSIUM SERPL-SCNC: 3.9 MMOL/L (ref 3.5–5.1)
PSA SERPL-MCNC: 0.8 NG/ML
SODIUM SERPL-SCNC: 142 MMOL/L (ref 133–143)
T4 FREE SERPL-MCNC: 0.9 NG/DL (ref 0.78–1.46)
TRIGL SERPL-MCNC: 209 MG/DL (ref 35–150)
TSH, 3RD GENERATION: 3.16 UIU/ML (ref 0.36–3.74)
VLDLC SERPL CALC-MCNC: 41.8 MG/DL (ref 6–23)

## 2023-07-10 ENCOUNTER — OFFICE VISIT (OUTPATIENT)
Dept: FAMILY MEDICINE CLINIC | Facility: CLINIC | Age: 58
End: 2023-07-10
Payer: COMMERCIAL

## 2023-07-10 VITALS
OXYGEN SATURATION: 96 % | WEIGHT: 187.6 LBS | SYSTOLIC BLOOD PRESSURE: 132 MMHG | DIASTOLIC BLOOD PRESSURE: 82 MMHG | BODY MASS INDEX: 34.52 KG/M2 | HEIGHT: 62 IN | HEART RATE: 66 BPM

## 2023-07-10 DIAGNOSIS — R53.83 OTHER FATIGUE: ICD-10-CM

## 2023-07-10 DIAGNOSIS — I10 ESSENTIAL HYPERTENSION: Primary | ICD-10-CM

## 2023-07-10 DIAGNOSIS — G47.33 OSA ON CPAP: ICD-10-CM

## 2023-07-10 DIAGNOSIS — E78.00 HYPERCHOLESTEROLEMIA: ICD-10-CM

## 2023-07-10 DIAGNOSIS — Z99.89 OSA ON CPAP: ICD-10-CM

## 2023-07-10 LAB
ANION GAP SERPL CALC-SCNC: 5 MMOL/L (ref 2–11)
BUN SERPL-MCNC: 11 MG/DL (ref 6–23)
CALCIUM SERPL-MCNC: 10 MG/DL (ref 8.3–10.4)
CHLORIDE SERPL-SCNC: 108 MMOL/L (ref 101–110)
CHOLEST SERPL-MCNC: 183 MG/DL
CO2 SERPL-SCNC: 27 MMOL/L (ref 21–32)
CREAT SERPL-MCNC: 1 MG/DL (ref 0.8–1.5)
GLUCOSE SERPL-MCNC: 79 MG/DL (ref 65–100)
HDLC SERPL-MCNC: 43 MG/DL (ref 40–60)
HDLC SERPL: 4.3
LDLC SERPL CALC-MCNC: 103 MG/DL
POTASSIUM SERPL-SCNC: 4.1 MMOL/L (ref 3.5–5.1)
SODIUM SERPL-SCNC: 140 MMOL/L (ref 133–143)
TRIGL SERPL-MCNC: 185 MG/DL (ref 35–150)
VLDLC SERPL CALC-MCNC: 37 MG/DL (ref 6–23)

## 2023-07-10 PROCEDURE — 96372 THER/PROPH/DIAG INJ SC/IM: CPT | Performed by: FAMILY MEDICINE

## 2023-07-10 PROCEDURE — 99214 OFFICE O/P EST MOD 30 MIN: CPT | Performed by: FAMILY MEDICINE

## 2023-07-10 PROCEDURE — 3079F DIAST BP 80-89 MM HG: CPT | Performed by: FAMILY MEDICINE

## 2023-07-10 PROCEDURE — 3075F SYST BP GE 130 - 139MM HG: CPT | Performed by: FAMILY MEDICINE

## 2023-07-10 RX ORDER — ROSUVASTATIN CALCIUM 20 MG/1
20 TABLET, COATED ORAL DAILY
Qty: 90 TABLET | Refills: 1 | Status: SHIPPED | OUTPATIENT
Start: 2023-07-10

## 2023-07-10 RX ORDER — SUCRALFATE 1 G/1
1 TABLET ORAL 4 TIMES DAILY
Qty: 360 TABLET | Refills: 1 | Status: SHIPPED | OUTPATIENT
Start: 2023-07-10

## 2023-07-10 RX ORDER — CYANOCOBALAMIN 1000 UG/ML
1000 INJECTION, SOLUTION INTRAMUSCULAR; SUBCUTANEOUS ONCE
Status: COMPLETED | OUTPATIENT
Start: 2023-07-10 | End: 2023-07-10

## 2023-07-10 RX ORDER — AMLODIPINE BESYLATE 10 MG/1
10 TABLET ORAL DAILY
Qty: 90 TABLET | Refills: 1 | Status: SHIPPED | OUTPATIENT
Start: 2023-07-10

## 2023-07-10 RX ADMIN — CYANOCOBALAMIN 1000 MCG: 1000 INJECTION, SOLUTION INTRAMUSCULAR; SUBCUTANEOUS at 15:15

## 2023-07-10 SDOH — ECONOMIC STABILITY: FOOD INSECURITY: WITHIN THE PAST 12 MONTHS, YOU WORRIED THAT YOUR FOOD WOULD RUN OUT BEFORE YOU GOT MONEY TO BUY MORE.: NEVER TRUE

## 2023-07-10 SDOH — ECONOMIC STABILITY: FOOD INSECURITY: WITHIN THE PAST 12 MONTHS, THE FOOD YOU BOUGHT JUST DIDN'T LAST AND YOU DIDN'T HAVE MONEY TO GET MORE.: NEVER TRUE

## 2023-07-10 SDOH — ECONOMIC STABILITY: HOUSING INSECURITY
IN THE LAST 12 MONTHS, WAS THERE A TIME WHEN YOU DID NOT HAVE A STEADY PLACE TO SLEEP OR SLEPT IN A SHELTER (INCLUDING NOW)?: NO

## 2023-07-10 SDOH — ECONOMIC STABILITY: INCOME INSECURITY: HOW HARD IS IT FOR YOU TO PAY FOR THE VERY BASICS LIKE FOOD, HOUSING, MEDICAL CARE, AND HEATING?: SOMEWHAT HARD

## 2023-07-10 ASSESSMENT — PATIENT HEALTH QUESTIONNAIRE - PHQ9
SUM OF ALL RESPONSES TO PHQ QUESTIONS 1-9: 0
2. FEELING DOWN, DEPRESSED OR HOPELESS: 0
SUM OF ALL RESPONSES TO PHQ QUESTIONS 1-9: 0
SUM OF ALL RESPONSES TO PHQ QUESTIONS 1-9: 0
SUM OF ALL RESPONSES TO PHQ9 QUESTIONS 1 & 2: 0
SUM OF ALL RESPONSES TO PHQ QUESTIONS 1-9: 0
1. LITTLE INTEREST OR PLEASURE IN DOING THINGS: 0

## 2024-01-11 ENCOUNTER — OFFICE VISIT (OUTPATIENT)
Dept: FAMILY MEDICINE CLINIC | Facility: CLINIC | Age: 59
End: 2024-01-11
Payer: COMMERCIAL

## 2024-01-11 VITALS
DIASTOLIC BLOOD PRESSURE: 83 MMHG | BODY MASS INDEX: 33.49 KG/M2 | WEIGHT: 182 LBS | HEIGHT: 62 IN | TEMPERATURE: 97.9 F | SYSTOLIC BLOOD PRESSURE: 138 MMHG | RESPIRATION RATE: 16 BRPM | HEART RATE: 71 BPM | OXYGEN SATURATION: 97 %

## 2024-01-11 DIAGNOSIS — M72.0 DUPUYTREN CONTRACTURE: ICD-10-CM

## 2024-01-11 DIAGNOSIS — E03.8 SUBCLINICAL HYPOTHYROIDISM: ICD-10-CM

## 2024-01-11 DIAGNOSIS — Z23 INFLUENZA VACCINE NEEDED: ICD-10-CM

## 2024-01-11 DIAGNOSIS — M75.102 ROTATOR CUFF SYNDROME OF LEFT SHOULDER: ICD-10-CM

## 2024-01-11 DIAGNOSIS — I10 ESSENTIAL HYPERTENSION: Primary | ICD-10-CM

## 2024-01-11 DIAGNOSIS — N40.0 BENIGN PROSTATIC HYPERPLASIA WITHOUT LOWER URINARY TRACT SYMPTOMS: ICD-10-CM

## 2024-01-11 DIAGNOSIS — E78.00 HYPERCHOLESTEROLEMIA: ICD-10-CM

## 2024-01-11 LAB
ANION GAP SERPL CALC-SCNC: 4 MMOL/L (ref 2–11)
BUN SERPL-MCNC: 10 MG/DL (ref 8–23)
CALCIUM SERPL-MCNC: 9.9 MG/DL (ref 8.3–10.4)
CHLORIDE SERPL-SCNC: 108 MMOL/L (ref 103–113)
CO2 SERPL-SCNC: 29 MMOL/L (ref 21–32)
CREAT SERPL-MCNC: 0.8 MG/DL (ref 0.8–1.5)
GLUCOSE SERPL-MCNC: 99 MG/DL (ref 65–100)
POTASSIUM SERPL-SCNC: 3.3 MMOL/L (ref 3.5–5.1)
PSA SERPL-MCNC: 0.9 NG/ML
SODIUM SERPL-SCNC: 141 MMOL/L (ref 136–146)
T4 FREE SERPL-MCNC: 0.9 NG/DL (ref 0.78–1.46)
TSH, 3RD GENERATION: 2.39 UIU/ML (ref 0.36–3.74)

## 2024-01-11 PROCEDURE — 90471 IMMUNIZATION ADMIN: CPT | Performed by: FAMILY MEDICINE

## 2024-01-11 PROCEDURE — 90674 CCIIV4 VAC NO PRSV 0.5 ML IM: CPT | Performed by: FAMILY MEDICINE

## 2024-01-11 PROCEDURE — 99214 OFFICE O/P EST MOD 30 MIN: CPT | Performed by: FAMILY MEDICINE

## 2024-01-11 PROCEDURE — 3079F DIAST BP 80-89 MM HG: CPT | Performed by: FAMILY MEDICINE

## 2024-01-11 PROCEDURE — 3075F SYST BP GE 130 - 139MM HG: CPT | Performed by: FAMILY MEDICINE

## 2024-01-11 RX ORDER — AMLODIPINE BESYLATE 10 MG/1
10 TABLET ORAL DAILY
Qty: 90 TABLET | Refills: 1 | Status: SHIPPED | OUTPATIENT
Start: 2024-01-11

## 2024-01-11 RX ORDER — ROSUVASTATIN CALCIUM 20 MG/1
20 TABLET, COATED ORAL DAILY
Qty: 90 TABLET | Refills: 1 | Status: SHIPPED | OUTPATIENT
Start: 2024-01-11

## 2024-01-11 ASSESSMENT — PATIENT HEALTH QUESTIONNAIRE - PHQ9
SUM OF ALL RESPONSES TO PHQ QUESTIONS 1-9: 0
SUM OF ALL RESPONSES TO PHQ9 QUESTIONS 1 & 2: 0
1. LITTLE INTEREST OR PLEASURE IN DOING THINGS: 0
SUM OF ALL RESPONSES TO PHQ QUESTIONS 1-9: 0
2. FEELING DOWN, DEPRESSED OR HOPELESS: 0

## 2024-01-11 NOTE — PROGRESS NOTES
Subjective:  Erica Dee is a 58 y.o. male presents today for their semi-annual htn as well as annual prostate check, and follow-up of subclinical hypothyroidism.  Dyslipidemia refills are needed but lipid profile last summer was fine.  They are having no side effects and are doing well.  Systems review of cardiovascular and pulmonary systems reveal no complaints or pertinent positives.  Patient denies any pounding heart beats or rapid heart beat intervals, flushing, panic like attacks, headaches, dizzyness or flushing.  PHQ-9 Total Score: 0 (1/11/2024  1:34 PM)  Frustrated with some nocturnal pain left shoulder.  Awakes in the morning with stiffness and pain.  Reduced range of motion, has come up somewhat suddenly over the last month or so.  Also has contracture that bothers him in the left palm area.    Your blood pressure goal is listed under the \"plan section\" below    No Known Allergies   reports that he has never smoked. He has never used smokeless tobacco.  Current Outpatient Medications   Medication Sig Dispense Refill    amLODIPine (NORVASC) 10 MG tablet Take 1 tablet by mouth daily 90 tablet 1    rosuvastatin (CRESTOR) 20 MG tablet Take 1 tablet by mouth daily 90 tablet 1     No current facility-administered medications for this visit.       Lab Results   Component Value Date/Time     07/10/2023 03:06 PM    K 4.1 07/10/2023 03:06 PM     07/10/2023 03:06 PM    CO2 27 07/10/2023 03:06 PM    BUN 11 07/10/2023 03:06 PM    CREATININE 1.00 07/10/2023 03:06 PM    GLUCOSE 79 07/10/2023 03:06 PM    CALCIUM 10.0 07/10/2023 03:06 PM          Objective:  Blood pressure 138/83, pulse 71, temperature 97.9 °F (36.6 °C), temperature source Temporal, resp. rate 16, height 1.575 m (5' 2\"), weight 82.6 kg (182 lb), SpO2 97 %.  Body mass index is 33.29 kg/m².  BP Readings from Last 3 Encounters:   01/11/24 138/83   07/10/23 132/82   01/17/23 117/75     General- Pleasant and no distress  Psych-

## 2024-01-12 ENCOUNTER — TELEPHONE (OUTPATIENT)
Dept: FAMILY MEDICINE CLINIC | Facility: CLINIC | Age: 59
End: 2024-01-12

## 2024-01-12 NOTE — TELEPHONE ENCOUNTER
Acknowledged enclosed information per Dr. Dorsey instructions to add Magnesium for next appointment reason.

## 2024-01-12 NOTE — TELEPHONE ENCOUNTER
----- Message from Redd Dorsey MD sent at 1/12/2024  7:35 AM EST -----  Lab OK/Stable/No Need To Call.  Potassium I will repeat at next visit.  Likely a variant, he has had no prior history of low potassium was not on a diuretic.  He has had borderline low potassiums however.  However, please add \"magnesium\" to his next appointment line